# Patient Record
Sex: FEMALE | Race: WHITE | Employment: FULL TIME
[De-identification: names, ages, dates, MRNs, and addresses within clinical notes are randomized per-mention and may not be internally consistent; named-entity substitution may affect disease eponyms.]

---

## 2020-05-27 ENCOUNTER — TELEPHONE (OUTPATIENT)
Dept: CASE MANAGEMENT | Age: 55
End: 2020-05-27

## 2020-05-27 VITALS — BODY MASS INDEX: 37.89 KG/M2 | WEIGHT: 250 LBS | HEIGHT: 68 IN

## 2020-05-29 ENCOUNTER — TELEPHONE (OUTPATIENT)
Dept: CASE MANAGEMENT | Age: 55
End: 2020-05-29

## 2020-06-01 ENCOUNTER — HOSPITAL ENCOUNTER (OUTPATIENT)
Dept: CT IMAGING | Age: 55
Discharge: HOME | End: 2020-06-03
Payer: COMMERCIAL

## 2020-06-01 PROCEDURE — G0297 LDCT FOR LUNG CA SCREEN: HCPCS

## 2020-06-03 ENCOUNTER — TELEPHONE (OUTPATIENT)
Dept: CASE MANAGEMENT | Age: 55
End: 2020-06-03

## 2020-06-03 NOTE — TELEPHONE ENCOUNTER
No call, encounter opened to process CT Lung Screening. CT Lung Screen: 6/1/2020  Impression   Normal CT of the chest   ______________________________________________________________________    Lung RADS category 1   Negative       No nodules and     1           No lung nodules                           Continue annual                  < 1%   definitely begin                  Nodule(s) with specific                screening with LDCT   nodules                             calcifications: complete,                   in 12 months                                                           central, popcorn,concentric                                            rings and fat containing                                             nodules         Pack years: 36    Social History     Tobacco Use  Smoking Status: Current Every Day Smoker   Start Date: 5   Quit Date:    Types: Cigarettes   Packs/Day: 1.00   Years: 40.00   Pack Years: 40   Smokeless Tobacco: Never Used         Results letter sent to patient via my chart or mailed.      [address]

## 2020-06-19 ENCOUNTER — HOSPITAL ENCOUNTER (OUTPATIENT)
Dept: ULTRASOUND IMAGING | Age: 55
Discharge: HOME | End: 2020-06-21
Payer: COMMERCIAL

## 2020-06-19 PROCEDURE — 76705 ECHO EXAM OF ABDOMEN: CPT

## 2022-12-13 LAB — MAMMOGRAPHY, EXTERNAL: NORMAL

## 2024-08-19 RX ORDER — DILTIAZEM HYDROCHLORIDE 120 MG/1
CAPSULE, EXTENDED RELEASE ORAL DAILY
Qty: 90 CAPSULE | Refills: 0 | Status: SHIPPED | OUTPATIENT
Start: 2024-08-19

## 2024-08-19 RX ORDER — LORATADINE 10 MG/1
10 TABLET ORAL DAILY
Qty: 90 TABLET | Refills: 0 | Status: SHIPPED | OUTPATIENT
Start: 2024-08-19

## 2024-08-19 NOTE — TELEPHONE ENCOUNTER
Last seen Visit date not found  Next appt 10/25/2024    Requested Prescriptions     Pending Prescriptions Disp Refills    dilTIAZem (CARTIA XT) 120 MG extended release capsule [Pharmacy Med Name: CARTIA (DILTIAZEM) 120MGXT CAPS] 90 capsule 0     Sig: TAKE 1 CAPSULE BY MOUTH EVERY DAY    loratadine (CLARITIN) 10 MG tablet [Pharmacy Med Name: ALLERGY RELF (LORATADINE) 10MG TABS] 90 tablet 0     Sig: TAKE 1 TABLET BY MOUTH EVERY DAY

## 2024-09-20 RX ORDER — ATORVASTATIN CALCIUM 40 MG/1
40 TABLET, FILM COATED ORAL DAILY
Qty: 90 TABLET | Refills: 3 | Status: SHIPPED | OUTPATIENT
Start: 2024-09-20

## 2024-10-22 SDOH — ECONOMIC STABILITY: FOOD INSECURITY: WITHIN THE PAST 12 MONTHS, YOU WORRIED THAT YOUR FOOD WOULD RUN OUT BEFORE YOU GOT MONEY TO BUY MORE.: SOMETIMES TRUE

## 2024-10-22 SDOH — ECONOMIC STABILITY: FOOD INSECURITY: WITHIN THE PAST 12 MONTHS, THE FOOD YOU BOUGHT JUST DIDN'T LAST AND YOU DIDN'T HAVE MONEY TO GET MORE.: NEVER TRUE

## 2024-10-22 SDOH — ECONOMIC STABILITY: INCOME INSECURITY: HOW HARD IS IT FOR YOU TO PAY FOR THE VERY BASICS LIKE FOOD, HOUSING, MEDICAL CARE, AND HEATING?: SOMEWHAT HARD

## 2024-10-22 SDOH — ECONOMIC STABILITY: TRANSPORTATION INSECURITY
IN THE PAST 12 MONTHS, HAS LACK OF TRANSPORTATION KEPT YOU FROM MEETINGS, WORK, OR FROM GETTING THINGS NEEDED FOR DAILY LIVING?: NO

## 2024-10-25 ENCOUNTER — OFFICE VISIT (OUTPATIENT)
Age: 59
End: 2024-10-25

## 2024-10-25 VITALS
BODY MASS INDEX: 38.55 KG/M2 | SYSTOLIC BLOOD PRESSURE: 133 MMHG | HEART RATE: 88 BPM | HEIGHT: 68 IN | WEIGHT: 254.4 LBS | DIASTOLIC BLOOD PRESSURE: 85 MMHG | OXYGEN SATURATION: 99 %

## 2024-10-25 DIAGNOSIS — Z53.20 LUNG CANCER SCREENING DECLINED BY PATIENT: ICD-10-CM

## 2024-10-25 DIAGNOSIS — E78.2 MIXED HYPERLIPIDEMIA: ICD-10-CM

## 2024-10-25 DIAGNOSIS — Z23 NEED FOR INFLUENZA VACCINATION: ICD-10-CM

## 2024-10-25 DIAGNOSIS — I10 BENIGN HYPERTENSION: Primary | ICD-10-CM

## 2024-10-25 DIAGNOSIS — J30.1 SEASONAL ALLERGIC RHINITIS DUE TO POLLEN: ICD-10-CM

## 2024-10-25 DIAGNOSIS — F17.219 CIGARETTE NICOTINE DEPENDENCE WITH NICOTINE-INDUCED DISORDER: ICD-10-CM

## 2024-10-25 LAB
ALBUMIN: NORMAL
ALP BLD-CCNC: NORMAL U/L
ALT SERPL-CCNC: NORMAL U/L
ANION GAP SERPL CALCULATED.3IONS-SCNC: NORMAL MMOL/L
AST SERPL-CCNC: NORMAL U/L
BASOPHILS ABSOLUTE: NORMAL
BASOPHILS RELATIVE PERCENT: NORMAL
BILIRUB SERPL-MCNC: NORMAL MG/DL
BUN BLDV-MCNC: NORMAL MG/DL
CALCIUM SERPL-MCNC: NORMAL MG/DL
CHLORIDE BLD-SCNC: NORMAL MMOL/L
CHOLESTEROL, TOTAL: NORMAL
CHOLESTEROL/HDL RATIO: NORMAL
CO2: NORMAL
CREAT SERPL-MCNC: NORMAL MG/DL
EOSINOPHILS ABSOLUTE: NORMAL
EOSINOPHILS RELATIVE PERCENT: NORMAL
GFR, ESTIMATED: NORMAL
GLUCOSE BLD-MCNC: NORMAL MG/DL
HCT VFR BLD CALC: NORMAL %
HDLC SERPL-MCNC: NORMAL MG/DL
HEMOGLOBIN: NORMAL
LDL CHOLESTEROL: NORMAL
LYMPHOCYTES ABSOLUTE: NORMAL
LYMPHOCYTES RELATIVE PERCENT: NORMAL
MCH RBC QN AUTO: NORMAL PG
MCHC RBC AUTO-ENTMCNC: NORMAL G/DL
MCV RBC AUTO: NORMAL FL
MONOCYTES ABSOLUTE: NORMAL
MONOCYTES RELATIVE PERCENT: NORMAL
NEUTROPHILS ABSOLUTE: NORMAL
NEUTROPHILS RELATIVE PERCENT: NORMAL
NONHDLC SERPL-MCNC: NORMAL MG/DL
PLATELET # BLD: NORMAL 10*3/UL
PMV BLD AUTO: NORMAL FL
POTASSIUM SERPL-SCNC: NORMAL MMOL/L
RBC # BLD: NORMAL 10*6/UL
SODIUM BLD-SCNC: NORMAL MMOL/L
TOTAL PROTEIN: NORMAL
TRIGL SERPL-MCNC: NORMAL MG/DL
VLDLC SERPL CALC-MCNC: NORMAL MG/DL
WBC # BLD: NORMAL 10*3/UL

## 2024-10-25 RX ORDER — LOSARTAN POTASSIUM AND HYDROCHLOROTHIAZIDE 25; 100 MG/1; MG/1
1 TABLET ORAL DAILY
COMMUNITY
Start: 2024-10-22

## 2024-10-25 ASSESSMENT — PATIENT HEALTH QUESTIONNAIRE - PHQ9
1. LITTLE INTEREST OR PLEASURE IN DOING THINGS: NOT AT ALL
SUM OF ALL RESPONSES TO PHQ9 QUESTIONS 1 & 2: 0
SUM OF ALL RESPONSES TO PHQ QUESTIONS 1-9: 0
SUM OF ALL RESPONSES TO PHQ QUESTIONS 1-9: 0
2. FEELING DOWN, DEPRESSED OR HOPELESS: NOT AT ALL
SUM OF ALL RESPONSES TO PHQ QUESTIONS 1-9: 0
SUM OF ALL RESPONSES TO PHQ QUESTIONS 1-9: 0

## 2024-10-25 ASSESSMENT — ENCOUNTER SYMPTOMS
ALLERGIC/IMMUNOLOGIC NEGATIVE: 1
EYES NEGATIVE: 1
SINUS PAIN: 0
FACIAL SWELLING: 0
GASTROINTESTINAL NEGATIVE: 1
RESPIRATORY NEGATIVE: 1
RECTAL PAIN: 0

## 2024-10-25 NOTE — ASSESSMENT & PLAN NOTE
Chronic, at goal (stable), continue current treatment plan, medication adherence emphasized, and lifestyle modifications recommended    Orders:    Comprehensive Metabolic Panel, Fasting; Future    Lipid, Fasting; Future

## 2024-10-25 NOTE — ASSESSMENT & PLAN NOTE
Chronic, worsening (exacerbation), the patient will continue her loratadine she will add some Coricidin HBP and Flonase

## 2024-10-25 NOTE — PROGRESS NOTES
Amy Dove (:  1965) is a 59 y.o. female,Established patient, here for evaluation of the following chief complaint(s):  Head Congestion (Pt having seasonal allergies with head congestion and cough off and on X 3 weeks.  She does take claritan daily  Pt had labs today at [facility].  Pt is requesting flu shot/L arm)         Assessment & Plan  Need for influenza vaccination    Flu shot given today    Orders:    Influenza, FLUCELVAX Trivalent, (age 6 mo+) IM, Preservative Free, 0.5mL    Mixed hyperlipidemia   Chronic, at goal (stable), continue current treatment plan, medication adherence emphasized, and lifestyle modifications recommended    Orders:    Comprehensive Metabolic Panel, Fasting; Future    Lipid, Fasting; Future    Benign hypertension   Chronic, at goal (stable), continue current treatment plan, medication adherence emphasized, and lifestyle modifications recommended    Orders:    Comprehensive Metabolic Panel, Fasting; Future    Lipid, Fasting; Future    Lung cancer screening declined by patient    Declines lung Ca screen         Seasonal allergic rhinitis due to pollen   Chronic, worsening (exacerbation), the patient will continue her loratadine she will add some Coricidin HBP and Flonase         Cigarette nicotine dependence with nicotine-induced disorder   Chronic, worsening (exacerbation), urged to quit smoking please see below         Patient will see me back in 6 months we went over seasonal allergy and drainage medications.  We went over breast cancer screening lung cancer screening she would like to hold off on this at that time she did have a colonoscopy.  She had blood work done this morning we will follow that up again in 6 months she will call when she needs refills.  I obviously urged her to quit smoking..  Visit lasted about 35 minutes  No follow-ups on file.       Subjective   HPI:  Patient comes in today for 6-month follow-up with her history of hypertension dyslipidemia

## 2024-10-29 ENCOUNTER — TELEPHONE (OUTPATIENT)
Age: 59
End: 2024-10-29

## 2024-11-22 RX ORDER — LORATADINE 10 MG/1
10 TABLET ORAL DAILY
Qty: 90 TABLET | Refills: 3 | Status: SHIPPED | OUTPATIENT
Start: 2024-11-22

## 2024-11-22 RX ORDER — DILTIAZEM HYDROCHLORIDE 120 MG/1
120 CAPSULE, COATED, EXTENDED RELEASE ORAL DAILY
Qty: 90 CAPSULE | Refills: 3 | Status: SHIPPED | OUTPATIENT
Start: 2024-11-22

## 2024-11-22 NOTE — TELEPHONE ENCOUNTER
Name of Medication(s) Requested:  Requested Prescriptions     Pending Prescriptions Disp Refills    dilTIAZem (CARDIZEM CD) 120 MG extended release capsule 90 capsule      Sig: Take 1 capsule by mouth daily    loratadine (CLARITIN) 10 MG tablet 90 tablet      Sig: Take 1 tablet by mouth daily       Medication is on current medication list Yes    Dosage and directions were verified? Yes    Quantity verified: 90 day supply     Pharmacy Verified?  Yes    Last Appointment:  10/25/2024    Future appts:  Future Appointments   Date Time Provider Department Center   4/25/2025  9:30 AM Jose Alberto Mayes DO [facility] DEP        (If no appt send self scheduling link. .REFILLAPPT)  Scheduling request sent?     [] Yes  [x] No    Does patient need updated?  [] Yes  [x] No

## 2025-01-24 RX ORDER — LOSARTAN POTASSIUM AND HYDROCHLOROTHIAZIDE 25; 100 MG/1; MG/1
1 TABLET ORAL DAILY
Qty: 90 TABLET | Refills: 3 | Status: SHIPPED | OUTPATIENT
Start: 2025-01-24

## 2025-02-27 ENCOUNTER — TELEPHONE (OUTPATIENT)
Age: 60
End: 2025-02-27

## 2025-02-27 DIAGNOSIS — J01.00 ACUTE NON-RECURRENT MAXILLARY SINUSITIS: Primary | ICD-10-CM

## 2025-02-27 RX ORDER — AZITHROMYCIN 250 MG/1
TABLET, FILM COATED ORAL
Qty: 6 TABLET | Refills: 0 | Status: SHIPPED | OUTPATIENT
Start: 2025-02-27 | End: 2025-03-09

## 2025-02-27 NOTE — TELEPHONE ENCOUNTER
Done   
She has been sick for 2-3 weeks Today she has cough and drainage that is yellowish. Her cough is productive sometimes. She has some congestion tool She wants an antibiiotic ordered at [facility] in [place]  
In my judgment no risk for PPH has been identified at this time.

## 2025-04-22 SDOH — ECONOMIC STABILITY: INCOME INSECURITY: IN THE LAST 12 MONTHS, WAS THERE A TIME WHEN YOU WERE NOT ABLE TO PAY THE MORTGAGE OR RENT ON TIME?: NO

## 2025-04-22 SDOH — ECONOMIC STABILITY: FOOD INSECURITY: WITHIN THE PAST 12 MONTHS, THE FOOD YOU BOUGHT JUST DIDN'T LAST AND YOU DIDN'T HAVE MONEY TO GET MORE.: NEVER TRUE

## 2025-04-22 SDOH — ECONOMIC STABILITY: FOOD INSECURITY: WITHIN THE PAST 12 MONTHS, YOU WORRIED THAT YOUR FOOD WOULD RUN OUT BEFORE YOU GOT MONEY TO BUY MORE.: NEVER TRUE

## 2025-04-22 SDOH — ECONOMIC STABILITY: TRANSPORTATION INSECURITY
IN THE PAST 12 MONTHS, HAS THE LACK OF TRANSPORTATION KEPT YOU FROM MEDICAL APPOINTMENTS OR FROM GETTING MEDICATIONS?: NO

## 2025-04-23 LAB
ALBUMIN: NORMAL
ALP BLD-CCNC: NORMAL U/L
ALT SERPL-CCNC: NORMAL U/L
AST SERPL-CCNC: NORMAL U/L
BILIRUB SERPL-MCNC: NORMAL MG/DL
BUN BLDV-MCNC: NORMAL MG/DL
CALCIUM SERPL-MCNC: NORMAL MG/DL
CHLORIDE BLD-SCNC: NORMAL MMOL/L
CHOLESTEROL, FASTING: NORMAL
CO2: NORMAL
CREAT SERPL-MCNC: NORMAL MG/DL
GFR, ESTIMATED: NORMAL
GLUCOSE FASTING: NORMAL
HDLC SERPL-MCNC: NORMAL MG/DL
LDL CHOLESTEROL: NORMAL
POTASSIUM SERPL-SCNC: NORMAL MMOL/L
SODIUM BLD-SCNC: NORMAL MMOL/L
TOTAL PROTEIN: NORMAL
TRIGLYCERIDE, FASTING: NORMAL

## 2025-04-25 ENCOUNTER — OFFICE VISIT (OUTPATIENT)
Age: 60
End: 2025-04-25
Payer: COMMERCIAL

## 2025-04-25 VITALS
DIASTOLIC BLOOD PRESSURE: 80 MMHG | RESPIRATION RATE: 16 BRPM | OXYGEN SATURATION: 97 % | SYSTOLIC BLOOD PRESSURE: 130 MMHG | HEIGHT: 68 IN | BODY MASS INDEX: 39.68 KG/M2 | WEIGHT: 261.8 LBS | HEART RATE: 90 BPM | TEMPERATURE: 97.9 F

## 2025-04-25 DIAGNOSIS — Z53.20 LUNG CANCER SCREENING DECLINED BY PATIENT: ICD-10-CM

## 2025-04-25 DIAGNOSIS — E78.2 MIXED HYPERLIPIDEMIA: ICD-10-CM

## 2025-04-25 DIAGNOSIS — F17.219 CIGARETTE NICOTINE DEPENDENCE WITH NICOTINE-INDUCED DISORDER: ICD-10-CM

## 2025-04-25 DIAGNOSIS — I10 BENIGN HYPERTENSION: ICD-10-CM

## 2025-04-25 DIAGNOSIS — I10 BENIGN HYPERTENSION: Primary | ICD-10-CM

## 2025-04-25 DIAGNOSIS — Z12.31 ENCOUNTER FOR SCREENING MAMMOGRAM FOR MALIGNANT NEOPLASM OF BREAST: ICD-10-CM

## 2025-04-25 PROCEDURE — 3075F SYST BP GE 130 - 139MM HG: CPT | Performed by: FAMILY MEDICINE

## 2025-04-25 PROCEDURE — 99214 OFFICE O/P EST MOD 30 MIN: CPT | Performed by: FAMILY MEDICINE

## 2025-04-25 PROCEDURE — 3079F DIAST BP 80-89 MM HG: CPT | Performed by: FAMILY MEDICINE

## 2025-04-25 ASSESSMENT — PATIENT HEALTH QUESTIONNAIRE - PHQ9
SUM OF ALL RESPONSES TO PHQ QUESTIONS 1-9: 0
1. LITTLE INTEREST OR PLEASURE IN DOING THINGS: NOT AT ALL
2. FEELING DOWN, DEPRESSED OR HOPELESS: NOT AT ALL

## 2025-04-25 NOTE — PROGRESS NOTES
Amy Dove (:  1965) is a 60 y.o. female,Established patient, here for evaluation of the following chief complaint(s):  Hypertension (Pt here for 6 month f/u.  Recent labs done and copy given to pt.  Pt states doing ok, no complaints) and Hyperlipidemia         Assessment & Plan  Encounter for screening mammogram for malignant neoplasm of breast  We will order a mammogram has been several years    Orders:    ELAINE ROBERT DIGITAL SCREEN BILATERAL PER PROTOCOL; Future    Mixed hyperlipidemia   Chronic, at goal (stable), continue current treatment plan, medication adherence emphasized, and lifestyle modifications recommended  Lipid panel will be done next visit she remains on Lipitor  Orders:    Comprehensive Metabolic Panel, Fasting; Future    CBC with Auto Differential; Future    Lipid, Fasting; Future    Benign hypertension   Chronic, at goal (stable), continue current treatment plan, medication adherence emphasized, and lifestyle modifications recommended  Patient will remain on her Hyzaar and her diltiazem her blood pressure stable 130/80  Orders:    ELAINE ROBERT DIGITAL SCREEN BILATERAL PER PROTOCOL; Future    Comprehensive Metabolic Panel, Fasting; Future    CBC with Auto Differential; Future    Lipid, Fasting; Future    Lung cancer screening declined by patient    The patient declines any lung cancer screening.         Cigarette nicotine dependence with nicotine-induced disorder    Patient was urged to quit smoking.           No follow-ups on file.     In summary her shingles vaccine is up-to-date.  Colonoscopy is up-to-date mammogram will be ordered labs are stable she is compliant with her medications I urged her to quit smoking she politely declined lung cancer screening I will see her back again in 6 months she will call when she needs refills   Subjective   HPI:  Patient comes in today for follow-up of her hypertension dyslipidemia nicotine dependence we offered her again a lung cancer

## 2025-04-25 NOTE — ASSESSMENT & PLAN NOTE
Chronic, at goal (stable), continue current treatment plan, medication adherence emphasized, and lifestyle modifications recommended  Patient will remain on her Hyzaar and her diltiazem her blood pressure stable 130/80  Orders:    ELAINE JONES DIGITAL SCREEN BILATERAL PER PROTOCOL; Future    Comprehensive Metabolic Panel, Fasting; Future    CBC with Auto Differential; Future    Lipid, Fasting; Future

## 2025-04-25 NOTE — ASSESSMENT & PLAN NOTE
Chronic, at goal (stable), continue current treatment plan, medication adherence emphasized, and lifestyle modifications recommended  Lipid panel will be done next visit she remains on Lipitor  Orders:    Comprehensive Metabolic Panel, Fasting; Future    CBC with Auto Differential; Future    Lipid, Fasting; Future

## 2025-05-30 RX ORDER — ATORVASTATIN CALCIUM 40 MG/1
40 TABLET, FILM COATED ORAL DAILY
Qty: 90 TABLET | Refills: 3 | Status: SHIPPED | OUTPATIENT
Start: 2025-05-30

## 2025-06-12 ENCOUNTER — HOSPITAL ENCOUNTER (OUTPATIENT)
Dept: MAMMOGRAPHY | Age: 60
Discharge: HOME | End: 2025-06-14
Attending: FAMILY MEDICINE
Payer: COMMERCIAL

## 2025-06-12 VITALS — WEIGHT: 250 LBS | BODY MASS INDEX: 38.01 KG/M2

## 2025-06-12 DIAGNOSIS — I10 BENIGN HYPERTENSION: ICD-10-CM

## 2025-06-12 DIAGNOSIS — Z12.31 ENCOUNTER FOR SCREENING MAMMOGRAM FOR MALIGNANT NEOPLASM OF BREAST: ICD-10-CM

## 2025-06-12 PROCEDURE — 77063 BREAST TOMOSYNTHESIS BI: CPT

## 2025-06-17 ENCOUNTER — RESULTS FOLLOW-UP (OUTPATIENT)
Age: 60
End: 2025-06-17